# Patient Record
Sex: FEMALE | Race: WHITE | URBAN - METROPOLITAN AREA
[De-identification: names, ages, dates, MRNs, and addresses within clinical notes are randomized per-mention and may not be internally consistent; named-entity substitution may affect disease eponyms.]

---

## 2017-09-13 ENCOUNTER — HISTORICAL (OUTPATIENT)
Dept: RADIOLOGY | Facility: HOSPITAL | Age: 42
End: 2017-09-13

## 2017-12-04 ENCOUNTER — HISTORICAL (OUTPATIENT)
Dept: SURGERY | Facility: HOSPITAL | Age: 42
End: 2017-12-04

## 2017-12-04 LAB
ABS NEUT (OLG): 4.9 X10(3)/MCL (ref 1.5–6.9)
B-HCG SERPL QL: NEGATIVE
BUN SERPL-MCNC: 11 MG/DL (ref 10–20)
CALCIUM SERPL-MCNC: 9.2 MG/DL (ref 8–10.5)
CHLORIDE SERPL-SCNC: 103 MMOL/L (ref 100–108)
CO2 SERPL-SCNC: 26 MMOL/L (ref 21–35)
CREAT SERPL-MCNC: 0.72 MG/DL (ref 0.7–1.3)
ERYTHROCYTE [DISTWIDTH] IN BLOOD BY AUTOMATED COUNT: 11.9 % (ref 11.5–17)
GLUCOSE SERPL-MCNC: 95 MG/DL (ref 75–116)
GROUP & RH: NORMAL
HCT VFR BLD AUTO: 40.4 % (ref 36–48)
HGB BLD-MCNC: 13.8 GM/DL (ref 12–16)
MCH RBC QN AUTO: 32 PG (ref 27–34)
MCHC RBC AUTO-ENTMCNC: 34 GM/DL (ref 31–36)
MCV RBC AUTO: 94 FL (ref 80–99)
PLATELET # BLD AUTO: 250 X10(3)/MCL (ref 140–400)
PMV BLD AUTO: 10.3 FL (ref 6.8–10)
POTASSIUM SERPL-SCNC: 4.1 MMOL/L (ref 3.6–5.2)
RBC # BLD AUTO: 4.3 X10(6)/MCL (ref 4.2–5.4)
SODIUM SERPL-SCNC: 138 MMOL/L (ref 135–145)
WBC # SPEC AUTO: 7.9 X10(3)/MCL (ref 4.5–11.5)

## 2017-12-05 ENCOUNTER — HISTORICAL (OUTPATIENT)
Dept: ANESTHESIOLOGY | Facility: HOSPITAL | Age: 42
End: 2017-12-05

## 2022-04-30 NOTE — OP NOTE
Patient:   Danielle Rodriguez            MRN: [id number]            FIN: [id number]               Age:   42 years     Sex:  Female     :  1975   Associated Diagnoses:   None   Author:   Ragini Cantrell MD      Operative Note   Operative Information   Date/ Time:  2017 07:49:00.     Procedures Performed: Code knife conization.     Preoperative Diagnosis: Severe cervical dysplasia.     Postoperative Diagnosis: Same.     Surgeon: Ragini Cantrell MD.     Anesthesia: IV sedation.     Speciman Removed: Cervical cone specimen  Endocervical curettings.     The patient was brought to the operating room and placed under [IV sedation anesthesia.  She was placed in the dorsal lithotomy position and prepped and draped usual fashion.  A red rubber catheter was used to drain the bladder of [clear] urine.  A single toothed tenaculum was then used to grasp the anterior lip of the cervix.  Lugol's solution was then used to outline the areas of the cervix.  Lugol's solution was then used to outline the areas of dysplasia.  A 0 Chromic was then used to tag the 3 and 9 o'clock positions of the cervix.  A circumferential incision was then made around all areas of the involved dysplasia with the scalpel and endocervical cone specimen was excise and tagged at the 12 o'clock position.  Unipolar electrocautery was used to obtain excellent hemostasis.  Endocervical curettage was performed.  Surgicel was then placed inside the cervical cavity and 3 and 9 o'clock stay sutures were reapproximated to hold this in place.  Good hemostasis was visualized.  The patient tolerated the procedure well and was transferred to recovery in stable condition..     Description of Procedure/Findings/    Complications.     Esimated blood loss: loss less than  75  cc.     Findings: Entire circumference of the cervix Lugol's negative.     Complications: None.